# Patient Record
Sex: FEMALE | Race: WHITE | Employment: OTHER | URBAN - METROPOLITAN AREA
[De-identification: names, ages, dates, MRNs, and addresses within clinical notes are randomized per-mention and may not be internally consistent; named-entity substitution may affect disease eponyms.]

---

## 2017-07-14 ENCOUNTER — HOSPITAL ENCOUNTER (OUTPATIENT)
Dept: MAMMOGRAPHY | Age: 52
Discharge: HOME | End: 2017-07-14
Attending: SPECIALIST
Payer: COMMERCIAL

## 2017-07-14 DIAGNOSIS — Z12.31 VISIT FOR SCREENING MAMMOGRAM: ICD-10-CM

## 2017-07-14 PROCEDURE — 77067 SCR MAMMO BI INCL CAD: CPT

## 2018-07-27 ENCOUNTER — HOSPITAL ENCOUNTER (OUTPATIENT)
Dept: MAMMOGRAPHY | Age: 53
Discharge: HOME | End: 2018-07-27
Attending: SPECIALIST
Payer: COMMERCIAL

## 2018-07-27 DIAGNOSIS — Z12.31 VISIT FOR SCREENING MAMMOGRAM: ICD-10-CM

## 2018-07-27 PROCEDURE — 77067 SCR MAMMO BI INCL CAD: CPT

## 2019-05-28 ENCOUNTER — OFFICE VISIT (OUTPATIENT)
Dept: CARDIOLOGY CLINIC | Age: 54
End: 2019-05-28

## 2019-05-28 VITALS
WEIGHT: 162 LBS | SYSTOLIC BLOOD PRESSURE: 104 MMHG | HEART RATE: 72 BPM | BODY MASS INDEX: 28.7 KG/M2 | RESPIRATION RATE: 16 BRPM | DIASTOLIC BLOOD PRESSURE: 80 MMHG | HEIGHT: 63 IN | OXYGEN SATURATION: 97 %

## 2019-05-28 DIAGNOSIS — E78.5 HYPERLIPIDEMIA, UNSPECIFIED HYPERLIPIDEMIA TYPE: ICD-10-CM

## 2019-05-28 DIAGNOSIS — I49.8 FLUTTERING HEART: ICD-10-CM

## 2019-05-28 DIAGNOSIS — R07.9 CHEST PAIN, UNSPECIFIED TYPE: Primary | ICD-10-CM

## 2019-05-28 RX ORDER — PANTOPRAZOLE SODIUM 40 MG/1
TABLET, DELAYED RELEASE ORAL
Refills: 0 | COMMUNITY
Start: 2019-05-23

## 2019-05-28 RX ORDER — ATORVASTATIN CALCIUM 10 MG/1
TABLET, FILM COATED ORAL
Refills: 0 | COMMUNITY
Start: 2019-05-24

## 2019-05-28 RX ORDER — ASPIRIN 81 MG/1
TABLET ORAL DAILY
COMMUNITY

## 2019-05-28 NOTE — PROGRESS NOTES
[address] [place] [address] 60, [place], [address] [facility]     Ivan Bloom is a 48 y.o. female. Patient is new to me. Subjective:     Dianne Hernandez is a 48year-old female with a PMHx significant for hyperlipidemia and GERD, referred for evaluation of abnormal EKG and complaints of chest discomfort and palpitations. She had an abnormal EKG through Dr. Rut Daly (PCP) that showed poor R wave progression. Patient reports non-exertional chest discomfort that she believes may be related to GERD. She states the chest discomfort occurs more so at night when she lays down. She also notes palpitations described as flutters. Patient has a history of GERD and had been on Omeprazole for the prior 10 years. She went to her Gastroenterologist recently and was switched to nightly Zantac, but reports ongoing chest discomfort. She has an upcoming upper endoscopy scheduled for 19. Patient had prior  and was started on Atorvastatin 10 mg daily. Her most recent LDL on 19 had gone down to 79. Of note, she had a normal cardiac evaluation 10 years ago in [place]. Cardiac risk factors include HLD and family history of heart disease. Her father  of an MI at age 39. Patient denies any exertional chest pain, dyspnea, palpitations, syncope, orthopnea, edema or paroxysmal nocturnal dyspnea. There are no active problems to display for this patient. Bismark Ledesma, DARWIN  History reviewed. No pertinent past medical history.    Past Surgical History:   Procedure Laterality Date    HX TONSILLECTOMY       Allergies   Allergen Reactions    Sulfa (Sulfonamide Antibiotics) Hives      Family History   Problem Relation Age of Onset    Heart Disease Father 39        MI    Diabetes Paternal Grandmother       Social History     Socioeconomic History    Marital status:      Spouse name: Not on file    Number of children: Not on file    Years of education: Not on file    Highest education level: Not on file   Occupational History    Not on file   Social Needs    Financial resource strain: Not on file    Food insecurity:     Worry: Not on file     Inability: Not on file    Transportation needs:     Medical: Not on file     Non-medical: Not on file   Tobacco Use    Smoking status: Never Smoker    Smokeless tobacco: Never Used   Substance and Sexual Activity    Alcohol use: Yes     Comment: SOCIALLY    Drug use: Never    Sexual activity: Not on file   Lifestyle    Physical activity:     Days per week: Not on file     Minutes per session: Not on file    Stress: Not on file   Relationships    Social connections:     Talks on phone: Not on file     Gets together: Not on file     Attends Church service: Not on file     Active member of club or organization: Not on file     Attends meetings of clubs or organizations: Not on file     Relationship status: Not on file    Intimate partner violence:     Fear of current or ex partner: Not on file     Emotionally abused: Not on file     Physically abused: Not on file     Forced sexual activity: Not on file   Other Topics Concern    Not on file   Social History Narrative    Not on file      Current Outpatient Medications   Medication Sig    pantoprazole (PROTONIX) 40 mg tablet take 1 tablet by mouth every morning    atorvastatin (LIPITOR) 10 mg tablet take 1 tablet by mouth once daily    aspirin delayed-release 81 mg tablet Take  by mouth daily.  multivitamin (ONE A DAY) tablet Take 1 Tab by mouth daily.  Omega-3-DHA-EPA-Fish Oil 1,000 (120-180) mg cap Take 1 Cap by mouth daily.  ascorbic acid (VITAMIN C) 500 mg tablet Take 500 mg by mouth daily.  calcium 500 mg tab Take  by mouth. No current facility-administered medications for this visit. Review of Systems  Constitutional: Negative for fever, chills, malaise/fatigue and diaphoresis.    Respiratory: Negative for cough, hemoptysis, sputum production, shortness of breath and wheezing. Cardiovascular: Negative for chest pain, palpitations, orthopnea, claudication, leg swelling and PND. Gastrointestinal: Negative for heartburn, nausea, vomiting, blood in stool and melena. Genitourinary: Negative for dysuria and flank pain. Musculoskeletal: Negative for joint pain and back pain. Skin: Negative for rash. Neurological: Negative for focal weakness, seizures, loss of consciousness, weakness and headaches. Endo/Heme/Allergies: Does not bruise/bleed easily. Psychiatric/Behavioral: Negative for memory loss. The patient does not have insomnia. Physical Exam:    Visit Vitals  /80 (BP 1 Location: Left arm, BP Patient Position: Sitting)   Pulse 72   Resp 16   Ht 5' 3\" (1.6 m)   Wt 162 lb (73.5 kg)   SpO2 97%   BMI 28.70 kg/m²     Wt Readings from Last 3 Encounters:   05/28/19 162 lb (73.5 kg)   02/28/14 170 lb (77.1 kg)       Gen: NAD    Mental Status - Alert. General Appearance - Not in acute distress. Neck - no JVD    Chest and Lung Exam   Inspection: Accessory muscles - No use of accessory muscles in breathing. Auscultation:   Breath sounds: - Normal.     Cardiovascular   Inspection: Jugular vein - Bilateral - Inspection Normal.   Palpation/Percussion:   Apical Impulse: - Normal.   Auscultation: Rhythm - Regular. Heart Sounds - S1 WNL and S2 WNL. No S3 or S4. Murmurs & Other Heart Sounds: Auscultation of the heart reveals - No Murmurs. Peripheral Vascular   Upper Extremity: Inspection - Bilateral - No Cyanotic nailbeds or Digital clubbing. Lower Extremity:   Palpation: Edema - Bilateral - No edema. Abdomen: Soft, non-tender, bowel sounds are active. Neuro: A&O times 3, CN and motor grossly WNL    Cardiographics  EKG 05/28/19 - Poor R wave progression, possible old anterior MI     Assessment:     Encounter Diagnoses   Name Primary?     Chest pain, unspecified type Yes    Fluttering heart     Hyperlipidemia, unspecified hyperlipidemia type           Plan: Gumaro Sandoval is a 48year-old female with a PMHx significant for hyperlipidemia and GERD, referred for evaluation of abnormal EKG and complaints of chest discomfort and palpitations. She had an abnormal EKG through Dr. Mushtaq Lange (PCP) that showed poor R wave progression. She has non-exertional chest discomfort that may be related to GERD as well as palpitations (flutters). She has an upcoming upper endoscopy scheduled for 19 for further evaluation. EKG today shows poor R wave progression, possible old anterior MI. BP is good. Her father  at age 39 of an MI. I will order stress echo for further evaluation of her symptoms. Her most recent LDL on 19 was at goal at 79 on Atorvastatin 10 mg daily. Follow up as needed after testing.     Written by London Rockwell, as dictated by Deidre Loera MD.

## 2019-05-28 NOTE — PROGRESS NOTES
1. Have you been to the ER, urgent care clinic since your last visit? Hospitalized since your last visit? NO.    2. Have you seen or consulted any other health care providers outside of the [address] [place] [address] since your last visit? Include any pap smears or colon screening. SEEN BY  AT [facility].       Chief Complaint   Patient presents with    New Patient     REFERRED BY PCP FOR CHEST PAIN DURING NIGHT WITH HEART FLUTTERINGS-NEEDING CARDIAC CLEARANCE FOR ENDO ON 6/13/19

## 2019-06-05 ENCOUNTER — TELEPHONE (OUTPATIENT)
Dept: OTHER | Age: 54
End: 2019-06-05

## 2019-06-05 DIAGNOSIS — R07.9 CHEST PAIN, UNSPECIFIED TYPE: Primary | ICD-10-CM

## 2019-06-05 NOTE — TELEPHONE ENCOUNTER
Patient wanted to advise that she will need cardiac clearance faxed to Dr. Chrissy Grace at [facility] once all test are completed. Fax #  [phone]. Thanks!

## 2019-06-12 ENCOUNTER — TELEPHONE (OUTPATIENT)
Dept: CARDIOLOGY CLINIC | Age: 54
End: 2019-06-12

## 2019-06-13 ENCOUNTER — DOCUMENTATION ONLY (OUTPATIENT)
Dept: CARDIOLOGY CLINIC | Age: 54
End: 2019-06-13

## 2019-06-13 NOTE — PROGRESS NOTES
Faxed clearance and stress and ECHO results to [facility] Attn:Dr Sage Ramos.  Fax#[phone] confirmation received

## 2019-06-13 NOTE — TELEPHONE ENCOUNTER
Spoke with patient  Verified patient with 2 patient identifiers    Patient concerned that still having some fluttering. Informed per LOV note 5/28/2019 states f/u after testing if needed. .  Patient verbalized understanding, states had endoscopy procedure today. Will wait for results of that first before scheduling follow up.

## 2019-06-17 ENCOUNTER — TELEPHONE (OUTPATIENT)
Dept: CARDIOLOGY CLINIC | Age: 54
End: 2019-06-17

## 2019-06-17 NOTE — TELEPHONE ENCOUNTER
Please call patient as she is having some fluttering that seems to be worsening at night and is interfering with her ability to sleep. Symptoms started a few months ago and has since progressed.      Please advise

## 2019-06-17 NOTE — TELEPHONE ENCOUNTER
Returned patient call, informed will need f/u appointment. Patient has f/u appointment scheduled with Dr Diego Paz tomorrow at 9 am.  Patient verbalized understanding.

## 2019-06-18 ENCOUNTER — OFFICE VISIT (OUTPATIENT)
Dept: CARDIOLOGY CLINIC | Age: 54
End: 2019-06-18

## 2019-06-18 VITALS
RESPIRATION RATE: 16 BRPM | DIASTOLIC BLOOD PRESSURE: 80 MMHG | HEART RATE: 72 BPM | HEIGHT: 63 IN | SYSTOLIC BLOOD PRESSURE: 114 MMHG | BODY MASS INDEX: 28.17 KG/M2 | WEIGHT: 159 LBS | OXYGEN SATURATION: 94 %

## 2019-06-18 DIAGNOSIS — I49.8 FLUTTERING HEART: Primary | ICD-10-CM

## 2019-06-18 RX ORDER — ESOMEPRAZOLE MAGNESIUM 40 MG/1
CAPSULE, DELAYED RELEASE ORAL
Refills: 0 | COMMUNITY
Start: 2019-06-13

## 2019-06-18 RX ORDER — RANITIDINE HCL 150 MG
150 TABLET ORAL 2 TIMES DAILY
COMMUNITY

## 2019-06-18 NOTE — PROGRESS NOTES
1. Have you been to the ER, urgent care clinic since your last visit? Hospitalized since your last visit? No.    2. Have you seen or consulted any other health care providers outside of the [address] [place] [address] since your last visit? Include any pap smears or colon screening. Seen by CASSI saxena, brought in copy of GI records.       Chief Complaint   Patient presents with    Results     heart fluttering worse at night, has seen GI

## 2019-06-18 NOTE — PROGRESS NOTES
[address] [place] [address] 601, [place], [address] [facility]     Jamal Ring is a 48 y.o. female last seen by me 3 weeks ago. Subjective:     Sanford Arvizu is a 48year-old female with a PMHx significant for hyperlipidemia and GERD who presents today for follow up after testing. At her last visit she complained of non-exertional chest discomfort that she believed may be related to GERD, as well as heart flutters. She was originally referred for abnormal EKG that showed poor R wave progression. Patient had a negative stress test on 19 and an echo on 06/10/19 that was remarkable for trace circumferential pericardial effusion, but otherwise normal.    Today, she complains of worsening of her palpitations described as flutters. She states this occurs daily and is most prominent at rest.    Patient has a history of GERD and had been on Omeprazole for the prior 10 years. She had an upper endoscopy on 19. Patient had prior  and was started on Atorvastatin 10 mg daily. Her most recent LDL on 19 had gone down to 79. Of note, she had a normal cardiac evaluation 10 years ago in [place]. Cardiac risk factors include HLD and family history of heart disease. Her father  of an MI at age 39. Patient denies any exertional chest pain, dyspnea, palpitations, syncope, orthopnea, edema or paroxysmal nocturnal dyspnea. There are no active problems to display for this patient. Eli Villafuerte NP  History reviewed. No pertinent past medical history.    Past Surgical History:   Procedure Laterality Date    HX TONSILLECTOMY       Allergies   Allergen Reactions    Sulfa (Sulfonamide Antibiotics) Hives      Family History   Problem Relation Age of Onset    Heart Disease Father 39        MI    Diabetes Paternal Grandmother       Social History     Socioeconomic History    Marital status:      Spouse name: Not on file    Number of children: Not on file    Years of education: Not on file    Highest education level: Not on file   Occupational History    Not on file   Social Needs    Financial resource strain: Not on file    Food insecurity:     Worry: Not on file     Inability: Not on file    Transportation needs:     Medical: Not on file     Non-medical: Not on file   Tobacco Use    Smoking status: Never Smoker    Smokeless tobacco: Never Used   Substance and Sexual Activity    Alcohol use: Yes     Comment: SOCIALLY    Drug use: Never    Sexual activity: Not on file   Lifestyle    Physical activity:     Days per week: Not on file     Minutes per session: Not on file    Stress: Not on file   Relationships    Social connections:     Talks on phone: Not on file     Gets together: Not on file     Attends Hindu service: Not on file     Active member of club or organization: Not on file     Attends meetings of clubs or organizations: Not on file     Relationship status: Not on file    Intimate partner violence:     Fear of current or ex partner: Not on file     Emotionally abused: Not on file     Physically abused: Not on file     Forced sexual activity: Not on file   Other Topics Concern    Not on file   Social History Narrative    Not on file      Current Outpatient Medications   Medication Sig    esomeprazole (NEXIUM) 40 mg capsule take 1 capsule by mouth every morning 1 hour before meals    glucosamine/chondr hunter A sod (OSTEO BI-FLEX PO) Take  by mouth.  raNITIdine (ZANTAC) 150 mg tablet Take 150 mg by mouth two (2) times a day.  pantoprazole (PROTONIX) 40 mg tablet take 1 tablet by mouth every morning    atorvastatin (LIPITOR) 10 mg tablet take 1 tablet by mouth once daily    aspirin delayed-release 81 mg tablet Take  by mouth daily.  multivitamin (ONE A DAY) tablet Take 1 Tab by mouth daily.  Omega-3-DHA-EPA-Fish Oil 1,000 (120-180) mg cap Take 1 Cap by mouth daily.  ascorbic acid (VITAMIN C) 500 mg tablet Take 500 mg by mouth daily.     calcium 500 mg tab Take  by mouth. No current facility-administered medications for this visit. Review of Systems  Constitutional: Negative for fever, chills, malaise/fatigue and diaphoresis. Respiratory: Negative for cough, hemoptysis, sputum production, shortness of breath and wheezing. Cardiovascular: Negative for chest pain, palpitations, orthopnea, claudication, leg swelling and PND. Gastrointestinal: Negative for heartburn, nausea, vomiting, blood in stool and melena. Genitourinary: Negative for dysuria and flank pain. Musculoskeletal: Negative for joint pain and back pain. Skin: Negative for rash. Neurological: Negative for focal weakness, seizures, loss of consciousness, weakness and headaches. Endo/Heme/Allergies: Does not bruise/bleed easily. Psychiatric/Behavioral: Negative for memory loss. The patient does not have insomnia. Physical Exam:    Visit Vitals  /80 (BP 1 Location: Left arm, BP Patient Position: Sitting)   Pulse 72   Resp 16   Ht 5' 3\" (1.6 m)   Wt 159 lb (72.1 kg)   SpO2 94%   BMI 28.17 kg/m²     Wt Readings from Last 3 Encounters:   06/18/19 159 lb (72.1 kg)   06/10/19 162 lb (73.5 kg)   06/05/19 162 lb (73.5 kg)       Gen: NAD    Mental Status - Alert. General Appearance - Not in acute distress. Neck - no JVD    Chest and Lung Exam   Inspection: Accessory muscles - No use of accessory muscles in breathing. Auscultation:   Breath sounds: - Normal.     Cardiovascular   Inspection: Jugular vein - Bilateral - Inspection Normal.   Palpation/Percussion:   Apical Impulse: - Normal.   Auscultation: Rhythm - Regular. Heart Sounds - S1 WNL and S2 WNL. No S3 or S4. Murmurs & Other Heart Sounds: Auscultation of the heart reveals - No Murmurs. Peripheral Vascular   Upper Extremity: Inspection - Bilateral - No Cyanotic nailbeds or Digital clubbing. Lower Extremity:   Palpation: Edema - Bilateral - No edema. Abdomen: Soft, non-tender, bowel sounds are active.     Neuro: A&O times 3, CN and motor grossly WNL    Cardiographics  EKG 2019 - SR, poor R wave progression, no change from prior     Assessment:     Encounter Diagnosis   Name Primary?  Fluttering heart Yes          Plan:     Patient had a negative stress test on 19 and an echo on 06/10/19 that was remarkable for trace circumferential pericardial effusion, but otherwise normal. Today, she complains of worsening palpitations (flutters). Her father  at age 39 of an MI. EKG today is unchanged. BP is good. We will order a 24h Holter monitor for further evaluation.     Phone follow up with results of Holter.     Written by Aden Vieira, as dictated by Emiliano Hernandez M.D.

## 2019-06-19 ENCOUNTER — CLINICAL SUPPORT (OUTPATIENT)
Dept: CARDIOLOGY CLINIC | Age: 54
End: 2019-06-19

## 2019-06-19 DIAGNOSIS — I49.8 FLUTTERING HEART: ICD-10-CM

## 2019-06-25 NOTE — PROGRESS NOTES
Vitaliy,    Please call the patient and inform that event monitor showed rare and occassional extra heart beats coming from the top and bottom chambers of the heart -- these are benign and are not concerning. These were not associated with any of her symptoms she reported. Otherwise, she had a normal heart rhythm and on arrhythmias. The symptoms she did report were associated with normal heart beats and heart rhythm. No significant abnormalities on this monitor, which is good.     Thanks,  [employer]

## 2019-06-26 NOTE — PROGRESS NOTES
Spoke with patient  Verified patient with 2 patient identifiers  Informed per Jeneal Saint NP event monitor showed rare and occassional extra heart beats coming from the top and bottom chambers of the heart -- these are benign and are not concerning.  These were not associated with any of her symptoms she reported. Trinda Farm, she had a normal heart rhythm and on arrhythmias.  The symptoms she did report were associated with normal heart beats and heart rhythm. No significant abnormalities on this monitor, which is good. Patient verbalized understanding.

## 2019-07-29 ENCOUNTER — HOSPITAL ENCOUNTER (OUTPATIENT)
Dept: MAMMOGRAPHY | Age: 54
Discharge: HOME | End: 2019-07-29
Attending: SPECIALIST
Payer: COMMERCIAL

## 2019-07-29 DIAGNOSIS — Z12.31 VISIT FOR SCREENING MAMMOGRAM: ICD-10-CM

## 2019-07-29 PROCEDURE — 77067 SCR MAMMO BI INCL CAD: CPT

## 2019-08-13 ENCOUNTER — OFFICE VISIT (OUTPATIENT)
Dept: CARDIOLOGY CLINIC | Age: 54
End: 2019-08-13

## 2019-08-13 VITALS
BODY MASS INDEX: 28.39 KG/M2 | SYSTOLIC BLOOD PRESSURE: 102 MMHG | OXYGEN SATURATION: 99 % | RESPIRATION RATE: 16 BRPM | WEIGHT: 160.2 LBS | DIASTOLIC BLOOD PRESSURE: 70 MMHG | HEIGHT: 63 IN | HEART RATE: 76 BPM

## 2019-08-13 DIAGNOSIS — I49.8 FLUTTERING HEART: Primary | ICD-10-CM

## 2019-08-13 DIAGNOSIS — K22.70 BARRETT'S ESOPHAGUS WITHOUT DYSPLASIA: ICD-10-CM

## 2019-08-13 DIAGNOSIS — I49.3 PVC (PREMATURE VENTRICULAR CONTRACTION): ICD-10-CM

## 2019-08-13 DIAGNOSIS — I49.1 PAC (PREMATURE ATRIAL CONTRACTION): ICD-10-CM

## 2019-08-13 NOTE — LETTER
8/13/19 Patient: Juliocesar Dougherty YOB: 1965 Date of Visit: 8/13/2019 Severiano Castillo NP 
[address] [place] [address] [address] [place] [address] VIA Facsimile: [phone] Dear Severiano Castillo NP, Thank you for referring Ms. Crystal Mcgarry to [facility] for evaluation. My notes for this consultation are attached. If you have questions, please do not hesitate to call me. I look forward to following your patient along with you.  
 
 
Sincerely, 
 
Sheri Garnett MD

## 2019-08-13 NOTE — PROGRESS NOTES
1. Have you been to the ER, urgent care clinic since your last visit? Hospitalized since your last visit? NO.    2. Have you seen or consulted any other health care providers outside of the [address] [place] [address] since your last visit? Include any pap smears or colon screening. SEEN BY GI AND DX OF [facility].         Chief Complaint   Patient presents with    Results     DISCUSS MONITOR    Follow-up     1 MONTH-HEART FLUTTERING AFTER STARTING CARAFATE AND HAS SINCE STOPPED AND CALLED GI

## 2019-08-13 NOTE — PROGRESS NOTES
[address], [place], [address]     Cal Garces is a 48 y.o. female last seen by me 2 months ago. Subjective:     Cal Garces reports she is still feeling the PAC's and PAVC's. She was diagnosed with Talbert's esophagus recently. She is now on Zantac and Nexium. She said she has woken up several times to \"spams\" which she could not tell was related to either GERD or her heart. Cardiac risk factors include HLD and family history of heart disease. Her father  of an MI at age 39. Patient denies any exertional chest pain, dyspnea, palpitations, syncope, orthopnea, edema or paroxysmal nocturnal dyspnea. There are no active problems to display for this patient. Roosevelt Gray NP  History reviewed. No pertinent past medical history.    Past Surgical History:   Procedure Laterality Date    HX TONSILLECTOMY       Allergies   Allergen Reactions    Sulfa (Sulfonamide Antibiotics) Hives      Family History   Problem Relation Age of Onset    Heart Disease Father 39        MI    Diabetes Paternal Grandmother       Social History     Socioeconomic History    Marital status:      Spouse name: Not on file    Number of children: Not on file    Years of education: Not on file    Highest education level: Not on file   Occupational History    Not on file   Social Needs    Financial resource strain: Not on file    Food insecurity:     Worry: Not on file     Inability: Not on file    Transportation needs:     Medical: Not on file     Non-medical: Not on file   Tobacco Use    Smoking status: Never Smoker    Smokeless tobacco: Never Used   Substance and Sexual Activity    Alcohol use: Yes     Comment: SOCIALLY    Drug use: Never    Sexual activity: Not on file   Lifestyle    Physical activity:     Days per week: Not on file     Minutes per session: Not on file    Stress: Not on file   Relationships    Social connections:     Talks on phone: Not on file     Gets together: Not on file     Attends Yarsani service: Not on file     Active member of club or organization: Not on file     Attends meetings of clubs or organizations: Not on file     Relationship status: Not on file    Intimate partner violence:     Fear of current or ex partner: Not on file     Emotionally abused: Not on file     Physically abused: Not on file     Forced sexual activity: Not on file   Other Topics Concern    Not on file   Social History Narrative    Not on file      Current Outpatient Medications   Medication Sig    esomeprazole (NEXIUM) 40 mg capsule take 1 capsule by mouth every morning 1 hour before meals    glucosamine/chondr hunter A sod (OSTEO BI-FLEX PO) Take  by mouth.  raNITIdine (ZANTAC) 150 mg tablet Take 150 mg by mouth two (2) times a day.  atorvastatin (LIPITOR) 10 mg tablet take 1 tablet by mouth once daily    aspirin delayed-release 81 mg tablet Take  by mouth daily.  multivitamin (ONE A DAY) tablet Take 1 Tab by mouth daily.  Omega-3-DHA-EPA-Fish Oil 1,000 (120-180) mg cap Take 1 Cap by mouth daily.  ascorbic acid (VITAMIN C) 500 mg tablet Take 500 mg by mouth daily.  pantoprazole (PROTONIX) 40 mg tablet take 1 tablet by mouth every morning    calcium 500 mg tab Take  by mouth. No current facility-administered medications for this visit. Review of Systems  Constitutional: Negative for fever, chills, malaise/fatigue and diaphoresis. Respiratory: Negative for cough, hemoptysis, sputum production, shortness of breath and wheezing. Cardiovascular: Negative for chest pain, palpitations, orthopnea, claudication, leg swelling and PND. +PAC's and PVC's  Gastrointestinal: Negative for heartburn, nausea, vomiting, blood in stool and melena. +Talbert's espohagus  Genitourinary: Negative for dysuria and flank pain. Musculoskeletal: Negative for joint pain and back pain. Skin: Negative for rash.   Neurological: Negative for focal weakness, seizures, loss of consciousness, weakness and headaches. Endo/Heme/Allergies: Does not bruise/bleed easily. Psychiatric/Behavioral: Negative for memory loss. The patient does not have insomnia. Physical Exam:    Visit Vitals  /70 (BP 1 Location: Left arm, BP Patient Position: Sitting)   Pulse 76   Resp 16   Ht 5' 3\" (1.6 m)   Wt 160 lb 3.2 oz (72.7 kg)   SpO2 99%   BMI 28.38 kg/m²     Wt Readings from Last 3 Encounters:   08/13/19 160 lb 3.2 oz (72.7 kg)   06/18/19 159 lb (72.1 kg)   06/10/19 162 lb (73.5 kg)       Gen: NAD    Mental Status - Alert. General Appearance - Not in acute distress. Neck - no JVD    Chest and Lung Exam   Inspection: Accessory muscles - No use of accessory muscles in breathing. Auscultation:   Breath sounds: - Normal.     Cardiovascular   Inspection: Jugular vein - Bilateral - Inspection Normal.   Palpation/Percussion:   Apical Impulse: - Normal.   Auscultation: Rhythm - Regular. Heart Sounds - S1 WNL and S2 WNL. No S3 or S4. Murmurs & Other Heart Sounds: Auscultation of the heart reveals - No Murmurs. Peripheral Vascular   Upper Extremity: Inspection - Bilateral - No Cyanotic nailbeds or Digital clubbing. Lower Extremity:   Palpation: Edema - Bilateral - No edema. Abdomen: Soft, non-tender, bowel sounds are active. Neuro: A&O times 3, CN and motor grossly WNL    Cardiographics  EKG 06/18/2019 - SR, poor R wave progression, no change from prior  Holter 6/21/19 - occasional PACs, occasional PVCs  EKG 8/13/19 - SR, poor R wave progression, no change from 6/18/19       Assessment:     Encounter Diagnoses   Name Primary?  Fluttering heart Yes    PAC (premature atrial contraction)     PVC (premature ventricular contraction)     Talbert's esophagus without dysplasia           Plan:     Holter revealed occasional PAC's and PVC's. Do not rec any treatment. Suspect current nocturnal spasms are related to her reflux    Return PRN.      Written by Haven Sorensen, as dictated by Ino Acuna M.D.

## 2019-09-04 ENCOUNTER — HOSPITAL ENCOUNTER (OUTPATIENT)
Age: 54
Setting detail: OUTPATIENT SURGERY
Discharge: HOME | End: 2019-09-04
Attending: INTERNAL MEDICINE | Admitting: INTERNAL MEDICINE
Payer: COMMERCIAL

## 2019-09-04 VITALS
DIASTOLIC BLOOD PRESSURE: 84 MMHG | RESPIRATION RATE: 18 BRPM | BODY MASS INDEX: 28.38 KG/M2 | OXYGEN SATURATION: 98 % | HEIGHT: 63 IN | SYSTOLIC BLOOD PRESSURE: 124 MMHG | HEART RATE: 74 BPM

## 2019-09-04 PROCEDURE — 74011000250 HC RX REV CODE- 250: Performed by: INTERNAL MEDICINE

## 2019-09-04 PROCEDURE — 76040000007: Performed by: INTERNAL MEDICINE

## 2019-09-04 RX ORDER — LIDOCAINE HYDROCHLORIDE 20 MG/ML
JELLY TOPICAL ONCE
Status: COMPLETED | OUTPATIENT
Start: 2019-09-04 | End: 2019-09-04

## 2019-09-04 RX ADMIN — LIDOCAINE HYDROCHLORIDE 5 ML: 20 JELLY TOPICAL at 10:05

## 2019-09-04 NOTE — DISCHARGE INSTRUCTIONS
Gladis Anderson  [id number]  1965      MANOMETRY DISCHARGE INSTRUCTION    You may resume your regular diet as tolerated. You may resume your normal daily activities. If you develop a sore throat- throat lozenges or warm salt water gargles will help. Call your Physician if you have any complications or questions. [employer] Activation    Thank you for requesting access to [facility]. Please follow the instructions below to securely access and download your online medical record. [facility] allows you to send messages to your doctor, view your test results, renew your prescriptions, schedule appointments, and more. How Do I Sign Up? 1. In your internet browser, go to [email or web address]  2. Click on the First Time User? Click Here link in the Sign In box. You will be redirect to the New Member Sign Up page. 3. Enter your [facility] Access Code exactly as it appears below. You will not need to use this code after youve completed the sign-up process. If you do not sign up before the expiration date, you must request a new code. [employer] Access Code: [email or web address]  Expires: 10/18/2019  2:51 PM (This is the date your [facility] access code will )    4. Enter the last four digits of your Social Security Number (xxxx) and Date of Birth (mm/dd/yyyy) as indicated and click Submit. You will be taken to the next sign-up page. 5. Create a [employer] ID. This will be your [employer] login ID and cannot be changed, so think of one that is secure and easy to remember. 6. Create a [employer] password. You can change your password at any time. 7. Enter your Password Reset Question and Answer. This can be used at a later time if you forget your password. 8. Enter your e-mail address. You will receive e-mail notification when new information is available in [address] 9. Click Sign Up. You can now view and download portions of your medical record.   10. Click the Download Summary menu link to download a portable copy of your medical information. Additional Information    If you have questions, please visit the Frequently Asked Questions section of the [employer] website at [email or web address] Remember, [employer] is NOT to be used for urgent needs. For medical emergencies, dial 911.

## 2019-09-10 ENCOUNTER — HOSPITAL ENCOUNTER (OUTPATIENT)
Dept: CT IMAGING | Age: 54
Discharge: HOME | End: 2019-09-10
Payer: SELF-PAY

## 2019-09-10 DIAGNOSIS — Z00.00 PREVENTATIVE HEALTH CARE: ICD-10-CM

## 2019-09-10 PROCEDURE — 75571 CT HRT W/O DYE W/CA TEST: CPT

## 2019-09-16 NOTE — PROCEDURES
Esophageal High-Resolution Manometry Report Summary     Date HRM Performed: 9/4/19   Referring physician: ISAK Fabian and Junior Recio MD  Indication: chest pain, GERD  ? PROCEDURE:   A solid-state recording assembly comprised of 36 circumferential pressure sensors spaced at 1 cm intervals was placed transnasally into the esophagus and positioned through the EGJ. The patient was positioned in the supine position. Mean EGJ junction pressures were measured during a 30-second baseline recording during which the patient was instructed to minimize swallowing. Contractility and pressurization pattern was assessed during ten 5-ml water swallows in the supine position at least 20-30 seconds apart, to generate the [place] Classification diagnosis. Moreover, multiple rapid swallows (5 2-mL water swallows <3 seconds apart) were performed. ? RESULTS:   Assembly traversed diaphragm? Yes   Location of proximal border of LES: 39.6 cm  EGJ morphology: Type 1  LES-CD separation: 1 cm   End-expiratory LESP: 12.8 mm Hg (nl 4.8-32.0 mm Hg)  Mid-respiratory LESP: 23.9 mm Hg (nl 13-43 mm Hg)  Mean IRP: 7.8 mm Hg (nl <15 mm Hg)  Mean DCI: 375 mm Hg-cm-sec (nl 450-8000)  Swallows: 3/10 failed and 5/10 weak and 1 intact  10/10 swallows with incomplete transit seen on impedance. MRS with augmentation intact. ?  IMPRESSION:  EGJ: The EGJ morphology is consistent with type I and is normotensive. There is evidence of normal EGJ outflow pressures based on IRP. Esophageal body: The contractile pattern is consistent with ineffective peristalsis/contractility. 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 Of note, many patients with ineffective motility have primary symptoms of refractory heartburn.    ?

## 2019-11-11 ENCOUNTER — HOSPITAL ENCOUNTER (OUTPATIENT)
Dept: NUCLEAR MEDICINE | Age: 54
Discharge: HOME | End: 2019-11-11
Attending: INTERNAL MEDICINE
Payer: COMMERCIAL

## 2019-11-11 DIAGNOSIS — K21.9 GASTROESOPHAGEAL REFLUX DISEASE WITHOUT ESOPHAGITIS: ICD-10-CM

## 2019-11-11 DIAGNOSIS — R10.13 EPIGASTRIC PAIN: ICD-10-CM

## 2019-11-11 DIAGNOSIS — K22.4 DIFFUSE SPASM OF ESOPHAGUS: ICD-10-CM

## 2019-11-11 DIAGNOSIS — K22.70 BARRETT'S ESOPHAGUS: ICD-10-CM

## 2019-11-11 PROCEDURE — 78264 GASTRIC EMPTYING IMG STUDY: CPT

## 2019-12-08 ENCOUNTER — HOSPITAL ENCOUNTER (EMERGENCY)
Age: 54
Discharge: HOME | End: 2019-12-09
Attending: EMERGENCY MEDICINE
Payer: COMMERCIAL

## 2019-12-08 ENCOUNTER — APPOINTMENT (OUTPATIENT)
Dept: GENERAL RADIOLOGY | Age: 54
End: 2019-12-08
Attending: NURSE PRACTITIONER
Payer: COMMERCIAL

## 2019-12-08 DIAGNOSIS — K59.00 CONSTIPATION, UNSPECIFIED CONSTIPATION TYPE: Primary | ICD-10-CM

## 2019-12-08 PROCEDURE — 96374 THER/PROPH/DIAG INJ IV PUSH: CPT

## 2019-12-08 PROCEDURE — 74019 RADEX ABDOMEN 2 VIEWS: CPT

## 2019-12-08 PROCEDURE — 99284 EMERGENCY DEPT VISIT MOD MDM: CPT

## 2019-12-08 PROCEDURE — 74011250636 HC RX REV CODE- 250/636: Performed by: EMERGENCY MEDICINE

## 2019-12-08 RX ORDER — POLYETHYLENE GLYCOL 3350, SODIUM SULFATE ANHYDROUS, SODIUM BICARBONATE, SODIUM CHLORIDE, POTASSIUM CHLORIDE 236; 22.74; 6.74; 5.86; 2.97 G/4L; G/4L; G/4L; G/4L; G/4L
4 POWDER, FOR SOLUTION ORAL
Qty: 4000 ML | Refills: 0 | Status: SHIPPED | OUTPATIENT
Start: 2019-12-09 | End: 2019-12-09

## 2019-12-08 RX ORDER — MORPHINE SULFATE 2 MG/ML
4 INJECTION, SOLUTION INTRAMUSCULAR; INTRAVENOUS
Status: COMPLETED | OUTPATIENT
Start: 2019-12-08 | End: 2019-12-08

## 2019-12-08 RX ADMIN — MORPHINE SULFATE 4 MG: 2 INJECTION, SOLUTION INTRAMUSCULAR; INTRAVENOUS at 21:21

## 2019-12-09 VITALS
HEART RATE: 73 BPM | RESPIRATION RATE: 16 BRPM | TEMPERATURE: 98.3 F | DIASTOLIC BLOOD PRESSURE: 73 MMHG | OXYGEN SATURATION: 99 % | SYSTOLIC BLOOD PRESSURE: 116 MMHG

## 2019-12-09 PROCEDURE — 74011250637 HC RX REV CODE- 250/637: Performed by: EMERGENCY MEDICINE

## 2019-12-09 RX ADMIN — LACTULOSE 45 ML: 20 SOLUTION ORAL at 00:10

## 2019-12-09 NOTE — ED NOTES
Pt given soap suds enema. Able to hold approx 200ml. Pt left side lying in fetal position. Instructed to press call bell when ready to use commode.

## 2019-12-09 NOTE — ED NOTES
Pt unable to have bowel movement, but states that she can feel the hard stool moving lower. Attempted soap suds enema again. Pt lying on left side. Call bell within reach. Instructed to press call bell when ready to use commode.

## 2019-12-09 NOTE — ED NOTES
Pt still unable to have BM. Attempted soap suds edema again. Pt lying on left side with call bell within reach.

## 2019-12-09 NOTE — DISCHARGE INSTRUCTIONS
Patient Education        Constipation: Care Instructions  Your Care Instructions    Constipation means that you have a hard time passing stools (bowel movements). People pass stools from 3 times a day to once every 3 days. What is normal for you may be different. Constipation may occur with pain in the rectum and cramping. The pain may get worse when you try to pass stools. Sometimes there are small amounts of bright red blood on toilet paper or the surface of stools. This is because of enlarged veins near the rectum (hemorrhoids). A few changes in your diet and lifestyle may help you avoid ongoing constipation. Your doctor may also prescribe medicine to help loosen your stool. Some medicines can cause constipation. These include pain medicines and antidepressants. Tell your doctor about all the medicines you take. Your doctor may want to make a medicine change to ease your symptoms. Follow-up care is a key part of your treatment and safety. Be sure to make and go to all appointments, and call your doctor if you are having problems. It's also a good idea to know your test results and keep a list of the medicines you take. How can you care for yourself at home? · Drink plenty of fluids, enough so that your urine is light yellow or clear like water. If you have kidney, heart, or liver disease and have to limit fluids, talk with your doctor before you increase the amount of fluids you drink. · Include high-fiber foods in your diet each day. These include fruits, vegetables, beans, and whole grains. · Get at least 30 minutes of exercise on most days of the week. Walking is a good choice. You also may want to do other activities, such as running, swimming, cycling, or playing tennis or team sports. · Take a fiber supplement, such as Citrucel or Metamucil, every day. Read and follow all instructions on the label. · Schedule time each day for a bowel movement. A daily routine may help.  Take your time having your bowel movement. · Support your feet with a small step stool when you sit on the toilet. This helps flex your hips and places your pelvis in a squatting position. · Your doctor may recommend an over-the-counter laxative to relieve your constipation. Examples are Milk of Magnesia and MiraLax. Read and follow all instructions on the label. Do not use laxatives on a long-term basis. When should you call for help? Call your doctor now or seek immediate medical care if:    · You have new or worse belly pain.     · You have new or worse nausea or vomiting.     · You have blood in your stools.    Watch closely for changes in your health, and be sure to contact your doctor if:    · Your constipation is getting worse.     · You do not get better as expected. Where can you learn more? Go to http://alok-suresh.info/. Enter 21 [phone] in the search box to learn more about \"Constipation: Care Instructions. \"  Current as of: June 26, 2019  Content Version: 12.2  © 6706-7000 [employer]. Care instructions adapted under license by [employer] (which disclaims liability or warranty for this information). If you have questions about a medical condition or this instruction, always ask your healthcare professional. [address] any warranty or liability for your use of this information.

## 2019-12-20 NOTE — ED PROVIDER NOTES
EMERGENCY DEPARTMENT HISTORY AND PHYSICAL EXAM      Date: 12/8/2019  Patient Name: Robert Saul  Patient Age and Sex: 47 y.o. female    History of Presenting Illness     Chief Complaint   Patient presents with    Constipation     constipation \"like a rock\" onset today. tried Miralax; tried [place]; a little came after the Fleet enema.  Fecal Impaction       History Provided By: Patient    HPI: Robert Saul, is a 47 y.o. female who is here with severe constipation. She has tried multiple laxatives, no avail. Fleet enema yesterday, this did not help either. History of constipation which she has had all of her life but never to this extent. No nausea or vomiting. Passing gas. Pt denies any other alleviating or exacerbating factors. There are no other complaints, changes or physical findings at this time. Past Medical History:   Diagnosis Date    Talbert's esophagus     GERD (gastroesophageal reflux disease)      Past Surgical History:   Procedure Laterality Date    HX TONSILLECTOMY      HX WISDOM TEETH EXTRACTION         PCP: Nando Bragg NP    Past History   Past Medical History:  Past Medical History:   Diagnosis Date    Talbert's esophagus     GERD (gastroesophageal reflux disease)        Past Surgical History:  Past Surgical History:   Procedure Laterality Date    HX TONSILLECTOMY      HX WISDOM TEETH EXTRACTION         Family History:  Family History   Problem Relation Age of Onset    Heart Disease Father 39        MI    Diabetes Paternal Grandmother        Social History:  Social History     Tobacco Use    Smoking status: Never Smoker    Smokeless tobacco: Never Used   Substance Use Topics    Alcohol use: Yes     Comment: SOCIALLY    Drug use: Never       Allergies: Allergies   Allergen Reactions    Sulfa (Sulfonamide Antibiotics) Hives       Current Medications:  No current facility-administered medications on file prior to encounter.       Current Outpatient Medications on File Prior to Encounter   Medication Sig Dispense Refill    esomeprazole (NEXIUM) 40 mg capsule take 1 capsule by mouth every morning 1 hour before meals  0    glucosamine/chondr hunter A sod (OSTEO BI-FLEX PO) Take  by mouth.  raNITIdine (ZANTAC) 150 mg tablet Take 150 mg by mouth two (2) times a day.  pantoprazole (PROTONIX) 40 mg tablet take 1 tablet by mouth every morning  0    atorvastatin (LIPITOR) 10 mg tablet take 1 tablet by mouth once daily  0    aspirin delayed-release 81 mg tablet Take  by mouth daily.  multivitamin (ONE A DAY) tablet Take 1 Tab by mouth daily.  calcium 500 mg tab Take  by mouth.  Omega-3-DHA-EPA-Fish Oil 1,000 (120-180) mg cap Take 1 Cap by mouth daily.  ascorbic acid (VITAMIN C) 500 mg tablet Take 500 mg by mouth daily. Review of Systems   Review of Systems   Constitutional: Negative. Negative for appetite change, chills and fever. HENT: Negative for congestion, ear pain, rhinorrhea, sinus pain, trouble swallowing and voice change. Respiratory: Negative for cough, chest tightness, shortness of breath, wheezing and stridor. Cardiovascular: Negative for chest pain, palpitations and leg swelling. Gastrointestinal: Positive for constipation. Negative for abdominal pain, blood in stool, diarrhea, nausea and vomiting. Genitourinary: Negative for difficulty urinating, dysuria, flank pain, frequency and hematuria. Musculoskeletal: Negative for arthralgias and joint swelling. Skin: Negative. Neurological: Negative for dizziness, syncope, weakness, numbness and headaches. All other systems reviewed and are negative. Physical Exam   Physical Exam  Vitals signs and nursing note reviewed. Constitutional:       Appearance: She is well-developed. She is not diaphoretic. HENT:      Head: Atraumatic. Eyes:      General: No scleral icterus.      Conjunctiva/sclera: Conjunctivae normal.      Pupils: Pupils are equal, round, and reactive to light.   Neck:      Musculoskeletal: Normal range of motion and neck supple. Vascular: No JVD. Cardiovascular:      Rate and Rhythm: Normal rate and regular rhythm. Heart sounds: Normal heart sounds. Pulmonary:      Effort: Pulmonary effort is normal.      Breath sounds: Normal breath sounds. Chest:      Chest wall: No tenderness. Abdominal:      General: Abdomen is protuberant. Bowel sounds are increased. There is distension. Palpations: Abdomen is soft. Tenderness: There is tenderness. There is no guarding or rebound. Comments: Not peritoneal.   On rectal exam, large hard stool felt in rectum. Musculoskeletal: Normal range of motion. Skin:     General: Skin is warm and dry. Neurological:      Mental Status: She is alert and oriented to person, place, and time. Cranial Nerves: No cranial nerve deficit. Diagnostic Study Results     Labs -  No results found for this or any previous visit (from the past 24 hour(s)). Radiologic Studies -   XR ABD FLAT/ ERECT   Final Result   IMPRESSION:   1. No evidence of acute process. Medical Decision Making   I am the first provider for this patient. Records Reviewed: I reviewed our electronic medical record system for any past medical records that were available that may contribute to the patient's current condition, including their PMH, surgical history, social and family history. Reviewed the nursing notes and vital signs from today's visit. Vital Signs-Reviewed the patient's vital signs. Provider Notes (Medical Decision Making):   Patient here with fecal impaction. Was disimpacted at the bedside after which she was able to pass some stool. Still feels constipated however. Now that fecal impaction is no longer there, I hope that po regiments will work and evacuate her. Will send home with golytely.    Return in 48 hours if she does not feel better or has not passed stool    ED Course: Initial assessment performed. The patients presenting problems have been discussed, and they are in agreement with the care plan formulated and outlined with them. I have encouraged them to ask questions as they arise throughout their visit. Medications Administered During ED Course:  Medications   morphine injection 4 mg (4 mg IntraVENous Given 12/8/19 2121)   lactulose (CHRONULAC) 10 gram/15 mL solution 45 mL (45 mL Oral Given 12/9/19 0010)     Progress note:  Patient has been reassessed and reports feeling considerably better, has normal vital signs and feels comfortable going home. I think this is reasonable as no findings today suggest a life-threatening condition. DISPOSITION: DISCHARGE  The patient's results have been reviewed with patient and available family and/or caregiver. They verbally convey their understanding and agreement of the patient's signs, symptoms, diagnosis, treatment and prognosis and additionally agree to follow up as recommended in the discharge instructions or to return to the Emergency Department should the patient's condition change prior to their follow-up appointment. The patient and available family and/or caregiver verbally agree with the care plan and all of their questions have been answered. The discharge instructions have also been provided to the them with educational information regarding the patient's diagnosis as well a list of reasons why the patient would want to return to the ER prior to their follow-up appointment should any concerns arise, the patient's condition change or symptoms worsen. Leighann Zapata MD, MSc  zdisch      Diagnosis     Clinical Impression:   1. Constipation, unspecified constipation type      Procedure Note - Manual Disimpaction:   Performed by: Dr Del Carney  Patient was manually disimpacted. A moderate amount of light brown stool was successfully removed. The procedure took 16-30 minutes, and pt tolerated well.     Attestation:  I personally performed the services described in this documentation on this date 12/8/2019 for patient Soham Vizcarra. Jesica Cortez MD    Please note that this dictation was completed with WUT, the computer voice recognition software. Quite often unanticipated grammatical, syntax, homophones, and other interpretive errors are inadvertently transcribed by the computer software. Please disregard these errors. Please excuse any errors that have escaped final proofreading.

## 2020-09-08 ENCOUNTER — HOSPITAL ENCOUNTER (OUTPATIENT)
Dept: MAMMOGRAPHY | Age: 55
Discharge: HOME | End: 2020-09-08
Attending: SPECIALIST
Payer: COMMERCIAL

## 2020-09-08 DIAGNOSIS — Z12.31 ENCOUNTER FOR SCREENING MAMMOGRAM FOR MALIGNANT NEOPLASM OF BREAST: ICD-10-CM

## 2020-09-08 PROCEDURE — 77067 SCR MAMMO BI INCL CAD: CPT

## 2021-07-19 ENCOUNTER — TRANSCRIBE ORDER (OUTPATIENT)
Dept: SCHEDULING | Age: 56
End: 2021-07-19

## 2021-07-19 DIAGNOSIS — Z12.31 VISIT FOR SCREENING MAMMOGRAM: Primary | ICD-10-CM

## 2021-09-09 ENCOUNTER — HOSPITAL ENCOUNTER (OUTPATIENT)
Dept: MAMMOGRAPHY | Age: 56
Discharge: HOME | End: 2021-09-09
Attending: SPECIALIST
Payer: COMMERCIAL

## 2021-09-09 DIAGNOSIS — Z12.31 VISIT FOR SCREENING MAMMOGRAM: ICD-10-CM

## 2021-09-09 PROCEDURE — 77067 SCR MAMMO BI INCL CAD: CPT

## 2022-08-11 ENCOUNTER — TRANSCRIBE ORDER (OUTPATIENT)
Dept: SCHEDULING | Age: 57
End: 2022-08-11

## 2022-08-11 DIAGNOSIS — Z12.31 SCREENING MAMMOGRAM FOR HIGH-RISK PATIENT: Primary | ICD-10-CM

## 2022-09-12 ENCOUNTER — HOSPITAL ENCOUNTER (OUTPATIENT)
Dept: MAMMOGRAPHY | Age: 57
Discharge: HOME | End: 2022-09-12
Attending: NURSE PRACTITIONER
Payer: COMMERCIAL

## 2022-09-12 DIAGNOSIS — Z12.31 SCREENING MAMMOGRAM FOR HIGH-RISK PATIENT: ICD-10-CM

## 2022-09-12 PROCEDURE — 77067 SCR MAMMO BI INCL CAD: CPT

## 2023-09-27 ENCOUNTER — HOSPITAL ENCOUNTER (OUTPATIENT)
Facility: HOSPITAL | Age: 58
Discharge: HOME | End: 2023-09-30
Payer: COMMERCIAL

## 2023-09-27 VITALS — WEIGHT: 155 LBS | BODY MASS INDEX: 27.46 KG/M2 | HEIGHT: 63 IN

## 2023-09-27 DIAGNOSIS — Z12.31 ENCOUNTER FOR SCREENING MAMMOGRAM FOR BREAST CANCER: ICD-10-CM

## 2023-09-27 PROCEDURE — 77063 BREAST TOMOSYNTHESIS BI: CPT

## (undated) DEVICE — BASIN EMSIS 16OZ GRAPHITE PLAS KID SHP MOLD GRAD FOR ORAL

## (undated) DEVICE — Device

## (undated) DEVICE — SYRINGE 50ML E/T

## (undated) DEVICE — SYR 10ML LUER LOK 1/5ML GRAD --